# Patient Record
Sex: MALE | Race: WHITE | NOT HISPANIC OR LATINO | Employment: FULL TIME | URBAN - METROPOLITAN AREA
[De-identification: names, ages, dates, MRNs, and addresses within clinical notes are randomized per-mention and may not be internally consistent; named-entity substitution may affect disease eponyms.]

---

## 2017-06-02 ENCOUNTER — HOSPITAL ENCOUNTER (OUTPATIENT)
Facility: MEDICAL CENTER | Age: 32
End: 2017-06-02
Attending: PHYSICIAN ASSISTANT
Payer: COMMERCIAL

## 2017-06-02 ENCOUNTER — OFFICE VISIT (OUTPATIENT)
Dept: URGENT CARE | Facility: CLINIC | Age: 32
End: 2017-06-02
Payer: COMMERCIAL

## 2017-06-02 VITALS
OXYGEN SATURATION: 99 % | DIASTOLIC BLOOD PRESSURE: 88 MMHG | HEIGHT: 67 IN | BODY MASS INDEX: 28.25 KG/M2 | WEIGHT: 180 LBS | RESPIRATION RATE: 15 BRPM | TEMPERATURE: 98.1 F | HEART RATE: 89 BPM | SYSTOLIC BLOOD PRESSURE: 142 MMHG

## 2017-06-02 DIAGNOSIS — N43.3 HYDROCELE, UNSPECIFIED HYDROCELE TYPE: ICD-10-CM

## 2017-06-02 DIAGNOSIS — N50.819 TESTICLE PAIN: ICD-10-CM

## 2017-06-02 LAB
APPEARANCE UR: CLEAR
BILIRUB UR STRIP-MCNC: NORMAL MG/DL
COLOR UR AUTO: YELLOW
GLUCOSE UR STRIP.AUTO-MCNC: NORMAL MG/DL
KETONES UR STRIP.AUTO-MCNC: NORMAL MG/DL
LEUKOCYTE ESTERASE UR QL STRIP.AUTO: NORMAL
NITRITE UR QL STRIP.AUTO: NORMAL
PH UR STRIP.AUTO: 5 [PH] (ref 5–8)
PROT UR QL STRIP: NORMAL MG/DL
RBC UR QL AUTO: NORMAL
SP GR UR STRIP.AUTO: 1
UROBILINOGEN UR STRIP-MCNC: NORMAL MG/DL

## 2017-06-02 PROCEDURE — 81002 URINALYSIS NONAUTO W/O SCOPE: CPT | Performed by: PHYSICIAN ASSISTANT

## 2017-06-02 PROCEDURE — 99204 OFFICE O/P NEW MOD 45 MIN: CPT | Performed by: PHYSICIAN ASSISTANT

## 2017-06-02 ASSESSMENT — ENCOUNTER SYMPTOMS
FLANK PAIN: 0
AFFECTED TESTICLE: 1
PALPITATIONS: 0
SHORTNESS OF BREATH: 0
CHILLS: 0
FEVER: 0
COUGH: 0
VOMITING: 0
NAUSEA: 0
BACK PAIN: 0

## 2017-06-02 NOTE — PROGRESS NOTES
"Subjective:      Gadiel Cabrera is a 31 y.o. male who presents with Testicle Pain            Testicle Pain  This is a new problem. Episode onset: 3 weeks ago. The problem occurs constantly. The problem has been waxing and waning. Pertinent negatives include no chest pain, chills, coughing, fever, nausea or vomiting. The symptoms are aggravated by standing. He has tried nothing for the symptoms. The treatment provided no relief.       Review of Systems   Constitutional: Negative for fever and chills.   Respiratory: Negative for cough and shortness of breath.    Cardiovascular: Negative for chest pain and palpitations.   Gastrointestinal: Negative for nausea and vomiting.   Genitourinary: Negative for dysuria, urgency, frequency, hematuria and flank pain.        Testicular pain     Musculoskeletal: Negative for back pain.     All other systems reviewed and are negative.  PMH:  has no past medical history on file.  MEDS: No current outpatient prescriptions on file.  ALLERGIES: No Known Allergies  SURGHX: History reviewed. No pertinent past surgical history.  SOCHX:  reports that he has never smoked. He does not have any smokeless tobacco history on file.  FH: Family history was reviewed, no pertinent findings to report  Medications, Allergies, and current problem list reviewed today in Epic       Objective:     /88 mmHg  Pulse 89  Temp(Src) 36.7 °C (98.1 °F)  Resp 15  Ht 1.702 m (5' 7.01\")  Wt 81.647 kg (180 lb)  BMI 28.19 kg/m2  SpO2 99%     Physical Exam   Constitutional: He is oriented to person, place, and time. He appears well-developed and well-nourished.   Neck: Normal range of motion. Neck supple.   Cardiovascular: Normal rate, regular rhythm and normal heart sounds.    Pulmonary/Chest: Effort normal and breath sounds normal.   Abdominal: Hernia confirmed negative in the right inguinal area and confirmed negative in the left inguinal area.   Genitourinary: Penis normal. Cremasteric reflex is " present. Right testis shows tenderness. Right testis shows no mass and no swelling. Left testis shows tenderness. Left testis shows no mass and no swelling. Circumcised. No penile tenderness.   Musculoskeletal: He exhibits no tenderness.   No CVA tenderness   Neurological: He is alert and oriented to person, place, and time.   Skin: Skin is warm and dry.   Psychiatric: He has a normal mood and affect. His behavior is normal. Judgment and thought content normal.   Vitals reviewed.         6/12/2017 8:03 AM    HISTORY/REASON FOR EXAM: Scrotum pain.    TECHNIQUE/EXAM DESCRIPTION:  Real-time sonography of the scrotum was performed with gray-scale, color and duplex Doppler imaging.    COMPARISON: None    FINDINGS:  The testes are homogeneous in echotexture and low-resistive waveforms are confirmed bilaterally. No intratesticular mass is seen. Vascular flow is symmetric.    The right testis measures 4.55 cm x 2.23 cm x 2.81 cm.  The left testis measures  4.55 cm x 2.34 cm x 3.02 cm.    Appearance of the epididymides are within normal limits.    Small left larger than right hydroceles are detected.    No varicocele is detected.         Impression        Normal scrotum ultrasound aside from small hydroceles.          Assessment/Plan:   Patient is a 31-year-old male who presents with testicular pain for 3 weeks. Patient states that he had no trauma to the area and pain gradually occurred. It is worse with movement and usually constant. He is monogamous with his girlfriend he denies dysuria, no discharge. On exam of the testicles there is pain to palpation on the posterior aspect. Cremaster reflex is present. No inguinal hernias were appreciated. UA is negative. We discussed differential diagnosis. Most likely epididymitis. Patient would like to hold antibiotics at this time and wait for urine cultures. If urine cultures are negative he will agree to a ultrasound.  US shows small hydroceles.  Urine culture for mary alice/chlamydia  negative from other facility.  Normal Urine cultures were lost by carrier and we do not have results.      1. Testicle pain    - URINE CULTURE(NEW); Future  - POCT Urinalysis  - CHLAMYDIA/GC PCR URINE OR SWAB; Future  - Referral to Urology    Differential diagnosis, natural history, supportive care, and indications for immediate follow-up discussed at length.   Follow-up with primary care provider within 4-5 days, emergency room precautions discussed.  Patient and/or family appears understanding of information.

## 2017-06-02 NOTE — PATIENT INSTRUCTIONS
Epididymitis  Epididymitis is swelling (inflammation) of the epididymis. The epididymis is a cord-like structure that is located along the back part of the testicle. Epididymitis is usually, but not always, caused by infection. This is usually a sudden problem that begins with chills, fever, and pain behind the scrotum and in the testicle. There may be swelling and redness of the testicle.  CAUSES  · STDs (sexually transmitted diseases).  ¨ Gonorrhea.  ¨ Chlamydia.  · Other contagious diseases, such as tuberculosis.  · Bladder outlet obstruction.  RISK FACTORS  · Having unprotected sex.  · Having anal sex.  · Having had a prostate biopsy.  · Having had an instrument inserted into the urinary tract, such as a urinary catheter.  · Having a suppressed immune system.  DIAGNOSIS  Your health care provider may use any of the following methods to diagnose epididymitis:  · A physical exam.  · An ultrasound-guided biopsy.  · A urine test for infections, such as STDs.  Your health care provider may test you for other STDs, including HIV (human immunodeficiency virus).  TREATMENT  Antibiotic medicine may be prescribed for epididymitis that is caused by an infection. Severe cases may require surgery.  HOME CARE INSTRUCTIONS  · To help relieve pain, take hot sitz baths for 20 minutes, 4 times per day.  · If your epididymitis was caused by an STD, avoid sexual activity until your treatment is complete.  · If you test positive for an STD, inform your sexual partners. They may need to be treated.  · Take medicines only as directed by your health care provider. These include over-the-counter medicines and prescription medicines for pain, discomfort, or fever.  · Take your antibiotic medicine as directed by your health care provider. Finish the antibiotic even if you start to feel better.  · Keep all follow-up visits as directed by your health care provider. This is important.  SEEK IMMEDIATE MEDICAL CARE IF:  · You have a  fever.  · Your pain medicine is not helping.  · Your pain is getting worse.  · Your pain seems to come and go.  · You develop pain, redness, and swelling in the scrotum or the areas around it.     This information is not intended to replace advice given to you by your health care provider. Make sure you discuss any questions you have with your health care provider.     Document Released: 12/15/2001 Document Revised: 2016 Document Reviewed: 2010  [employer] Interactive Patient Education  [employer]  Testicular Torsion  Testicular torsion is a twisting of the spermatic cord, artery, and vein that go to the testicle. This twisting cuts off the blood supply to everything in the sac that contains the testes, blood vessels, and part of the spermatic cord (scrotum). Testicular torsion is most commonly seen in  and adolescent males. It can also occur before birth.  Testicular torsion requires emergency treatment. The testicle usually can be saved if the torsion is treated within 6 hours of onset. If the torsion is left untreated for too long, the testicle will die and have to be removed.   CAUSES   Torsion can be caused by a hit on the scrotum or by certain movements during exercise. In some males, testicular torsion is more common because the connection of their testicle to a specific tissue in their scrotum developed in the wrong place, allowing the testicle to rotate and the cord to get twisted.   SIGNS AND SYMPTOMS   The main symptom of testicular torsion is pain in your testicle. The scrotum may be swollen, red, hard, and very tender. There will be excess fluid in the tissue (edema). The testicle may be higher than normal in the scrotum. The skin of the scrotum may be stuck to the testicle. You may have nausea, vomiting, and a fever.  DIAGNOSIS   Often testicular torsion is diagnosed through a physical exam. Sometimes imaging exams and tests to measure blood flow may be done.  TREATMENT   A  manual untwisting of the testicle may be done when the testicle is still mobile and the maneuver is not too painful. However, surgery usually is necessary and should be done as soon as possible after torsion occurs. During surgery, the testicle is untwisted and evaluated and possibly removed.      This information is not intended to replace advice given to you by your health care provider. Make sure you discuss any questions you have with your health care provider.     Document Released: 12/18/2006 Document Revised: 12/23/2014 Document Reviewed: 06/09/2014  [employer] Interactive Patient Education ©2016 [employer]  Inguinal Hernia, Adult  Muscles help keep everything in the body in its proper place. But if a weak spot in the muscles develops, something can poke through. That is called a hernia. When this happens in the lower part of the belly (abdomen), it is called an inguinal hernia. (It takes its name from a part of the body in this region called the inguinal canal.) A weak spot in the wall of muscles lets some fat or part of the small intestine bulge through. An inguinal hernia can develop at any age. Men get them more often than women.  CAUSES   In adults, an inguinal hernia develops over time.  · It can be triggered by:  ¨ Suddenly straining the muscles of the lower abdomen.  ¨ Lifting heavy objects.  ¨ Straining to have a bowel movement. Difficult bowel movements (constipation) can lead to this.  ¨ Constant coughing. This may be caused by smoking or lung disease.  ¨ Being overweight.  ¨ Being pregnant.  ¨ Working at a job that requires long periods of standing or heavy lifting.  ¨ Having had an inguinal hernia before.  One type can be an emergency situation. It is called a strangulated inguinal hernia. It develops if part of the small intestine slips through the weak spot and cannot get back into the abdomen. The blood supply can be cut off. If that happens, part of the intestine may die. This situation  requires emergency surgery.  SYMPTOMS   Often, a small inguinal hernia has no symptoms. It is found when a healthcare provider does a physical exam. Larger hernias usually have symptoms.   · In adults, symptoms may include:  ¨ A lump in the groin. This is easier to see when the person is standing. It might disappear when lying down.  ¨ In men, a lump in the scrotum.  ¨ Pain or burning in the groin. This occurs especially when lifting, straining or coughing.  ¨ A dull ache or feeling of pressure in the groin.  · Signs of a strangulated hernia can include:  ¨ A bulge in the groin that becomes very painful and tender to the touch.  ¨ A bulge that turns red or purple.  ¨ Fever, nausea and vomiting.  ¨ Inability to have a bowel movement or to pass gas.  DIAGNOSIS   To decide if you have an inguinal hernia, a healthcare provider will probably do a physical examination.  · This will include asking questions about any symptoms you have noticed.  · The healthcare provider might feel the groin area and ask you to cough. If an inguinal hernia is felt, the healthcare provider may try to slide it back into the abdomen.  · Usually no other tests are needed.  TREATMENT   Treatments can vary. The size of the hernia makes a difference. Options include:  · Watchful waiting. This is often suggested if the hernia is small and you have had no symptoms.  ¨ No medical procedure will be done unless symptoms develop.  ¨ You will need to watch closely for symptoms. If any occur, contact your healthcare provider right away.  · Surgery. This is used if the hernia is larger or you have symptoms.  ¨ Open surgery. This is usually an outpatient procedure (you will not stay overnight in a hospital). An cut (incision) is made through the skin in the groin. The hernia is put back inside the abdomen. The weak area in the muscles is then repaired by herniorrhaphy or hernioplasty. Herniorrhaphy: in this type of surgery, the weak muscles are sewn back  "together. Hernioplasty: a patch or mesh is used to close the weak area in the abdominal wall.  ¨ Laparoscopy. In this procedure, a surgeon makes small incisions. A thin tube with a tiny video camera (called a laparoscope) is put into the abdomen. The surgeon repairs the hernia with mesh by looking with the video camera and using two long instruments.  HOME CARE INSTRUCTIONS   · After surgery to repair an inguinal hernia:  ¨ You will need to take pain medicine prescribed by your healthcare provider. Follow all directions carefully.  ¨ You will need to take care of the wound from the incision.  ¨ Your activity will be restricted for awhile. This will probably include no heavy lifting for several weeks. You also should not do anything too active for a few weeks. When you can return to work will depend on the type of job that you have.  · During \"watchful waiting\" periods, you should:  ¨ Maintain a healthy weight.  ¨ Eat a diet high in fiber (fruits, vegetables and whole grains).  ¨ Drink plenty of fluids to avoid constipation. This means drinking enough water and other liquids to keep your urine clear or pale yellow.  ¨ Do not lift heavy objects.  ¨ Do not stand for long periods of time.  ¨ Quit smoking. This should keep you from developing a frequent cough.  SEEK MEDICAL CARE IF:   · A bulge develops in your groin area.  · You feel pain, a burning sensation or pressure in the groin. This might be worse if you are lifting or straining.  · You develop a fever of more than 100.5° F (38.1° C).  SEEK IMMEDIATE MEDICAL CARE IF:   · Pain in the groin increases suddenly.  · A bulge in the groin gets bigger suddenly and does not go down.  · For men, there is sudden pain in the scrotum. Or, the size of the scrotum increases.  · A bulge in the groin area becomes red or purple and is painful to touch.  · You have nausea or vomiting that does not go away.  · You feel your heart beating much faster than normal.  · You cannot have a " bowel movement or pass gas.  · You develop a fever of more than 102.0° F (38.9° C).     This information is not intended to replace advice given to you by your health care provider. Make sure you discuss any questions you have with your health care provider.     Document Released: 05/06/2010 Document Revised: 03/11/2013 Document Reviewed: 05/06/2010  [email or web address] Interactive Patient Education ©2016 [employer]

## 2017-06-02 NOTE — MR AVS SNAPSHOT
"        Gadiel Cabrera   2017 10:30 AM   Office Visit   MRN: [id number]    Department:  [facility]   Dept Phone:  [phone]    Description:  Male : 1985   Provider:  Dharmesh Armas PA-C           Reason for Visit     Testicle Pain atraumatic unspecified generalized testicular pain x 3 weeks.       Allergies as of 2017     No Known Allergies      You were diagnosed with     Testicle pain   [[id number]]         Vital Signs     Blood Pressure Pulse Temperature Respirations Height Weight    142/88 mmHg 89 36.7 °C (98.1 °F) 15 1.702 m (5' 7.01\") 81.647 kg (180 lb)    Body Mass Index Oxygen Saturation Smoking Status             28.19 kg/m2 99% Never Smoker          Basic Information     Date Of Birth Sex Race Ethnicity Preferred Language    1985 Male White Non- English      Health Maintenance     Patient has no pending health maintenance at this time      Results     POCT Urinalysis      Component Value Standard Range & Units    POC Color yellow Negative    POC Appearance clear Negative    POC Leukocyte Esterase n Negative    POC Nitrites n Negative    POC Urobiligen n Negative (0.2) mg/dL    POC Protein n Negative mg/dL    POC Urine PH 5.0 5.0 - 8.0    POC Blood n Negative    POC Specific Gravity 1.005 <1.005 - >1.030    POC Ketones n Negative mg/dL    POC Biliruben n Negative mg/dL    POC Glucose n Negative mg/dL                        Current Immunizations     No immunizations on file.      Below and/or attached are the medications your provider expects you to take. Review all of your home medications and newly ordered medications with your provider and/or pharmacist. Follow medication instructions as directed by your provider and/or pharmacist. Please keep your medication list with you and share with your provider. Update the information when medications are discontinued, doses are changed, or new medications (including over-the-counter products) are added; and carry medication " information at all times in the event of emergency situations     Allergies:  No Known Allergies          Medications  Valid as of: June 02, 2017 - 11:19 AM    Generic Name Brand Name Tablet Size Instructions for use    .                 Medicines prescribed today were sent to:     [facility] #[id number] - [place], [place] - [address]    [address] [place] [address]    Phone: [phone] Fax: [phone]    Open 24 Hours?: No      Medication refill instructions:       If your prescription bottle indicates you have medication refills left, it is not necessary to call your provider’s office. Please contact your pharmacy and they will refill your medication.    If your prescription bottle indicates you do not have any refills left, you may request refills at any time through one of the following ways: The online [employer] system (except Urgent Care), by calling your provider’s office, or by asking your pharmacy to contact your provider’s office with a refill request. Medication refills are processed only during regular business hours and may not be available until the next business day. Your provider may request additional information or to have a follow-up visit with you prior to refilling your medication.   *Please Note: Medication refills are assigned a new Rx number when refilled electronically. Your pharmacy may indicate that no refills were authorized even though a new prescription for the same medication is available at the pharmacy. Please request the medicine by name with the pharmacy before contacting your provider for a refill.        Your To Do List     Future Labs/Procedures Complete By Expires    CHLAMYDIA/GC PCR URINE OR SWAB  As directed 6/2/2018    URINE CULTURE(NEW)  As directed 6/2/2018      Instructions    Epididymitis  Epididymitis is swelling (inflammation) of the epididymis. The epididymis is a cord-like structure that is located along the back part of the testicle. Epididymitis is  usually, but not always, caused by infection. This is usually a sudden problem that begins with chills, fever, and pain behind the scrotum and in the testicle. There may be swelling and redness of the testicle.  CAUSES  · STDs (sexually transmitted diseases).  ¨ Gonorrhea.  ¨ Chlamydia.  · Other contagious diseases, such as tuberculosis.  · Bladder outlet obstruction.  RISK FACTORS  · Having unprotected sex.  · Having anal sex.  · Having had a prostate biopsy.  · Having had an instrument inserted into the urinary tract, such as a urinary catheter.  · Having a suppressed immune system.  DIAGNOSIS  Your health care provider may use any of the following methods to diagnose epididymitis:  · A physical exam.  · An ultrasound-guided biopsy.  · A urine test for infections, such as STDs.  Your health care provider may test you for other STDs, including HIV (human immunodeficiency virus).  TREATMENT  Antibiotic medicine may be prescribed for epididymitis that is caused by an infection. Severe cases may require surgery.  HOME CARE INSTRUCTIONS  · To help relieve pain, take hot sitz baths for 20 minutes, 4 times per day.  · If your epididymitis was caused by an STD, avoid sexual activity until your treatment is complete.  · If you test positive for an STD, inform your sexual partners. They may need to be treated.  · Take medicines only as directed by your health care provider. These include over-the-counter medicines and prescription medicines for pain, discomfort, or fever.  · Take your antibiotic medicine as directed by your health care provider. Finish the antibiotic even if you start to feel better.  · Keep all follow-up visits as directed by your health care provider. This is important.  SEEK IMMEDIATE MEDICAL CARE IF:  · You have a fever.  · Your pain medicine is not helping.  · Your pain is getting worse.  · Your pain seems to come and go.  · You develop pain, redness, and swelling in the scrotum or the areas around  it.     This information is not intended to replace advice given to you by your health care provider. Make sure you discuss any questions you have with your health care provider.     Document Released: 12/15/2001 Document Revised: 2016 Document Reviewed: 2010  [employer] Interactive Patient Education © [employer]  Testicular Torsion  Testicular torsion is a twisting of the spermatic cord, artery, and vein that go to the testicle. This twisting cuts off the blood supply to everything in the sac that contains the testes, blood vessels, and part of the spermatic cord (scrotum). Testicular torsion is most commonly seen in  and adolescent males. It can also occur before birth.  Testicular torsion requires emergency treatment. The testicle usually can be saved if the torsion is treated within 6 hours of onset. If the torsion is left untreated for too long, the testicle will die and have to be removed.   CAUSES   Torsion can be caused by a hit on the scrotum or by certain movements during exercise. In some males, testicular torsion is more common because the connection of their testicle to a specific tissue in their scrotum developed in the wrong place, allowing the testicle to rotate and the cord to get twisted.   SIGNS AND SYMPTOMS   The main symptom of testicular torsion is pain in your testicle. The scrotum may be swollen, red, hard, and very tender. There will be excess fluid in the tissue (edema). The testicle may be higher than normal in the scrotum. The skin of the scrotum may be stuck to the testicle. You may have nausea, vomiting, and a fever.  DIAGNOSIS   Often testicular torsion is diagnosed through a physical exam. Sometimes imaging exams and tests to measure blood flow may be done.  TREATMENT   A manual untwisting of the testicle may be done when the testicle is still mobile and the maneuver is not too painful. However, surgery usually is necessary and should be done as soon as  possible after torsion occurs. During surgery, the testicle is untwisted and evaluated and possibly removed.      This information is not intended to replace advice given to you by your health care provider. Make sure you discuss any questions you have with your health care provider.     Document Released: 12/18/2006 Document Revised: 12/23/2014 Document Reviewed: 06/09/2014  [employer] Interactive Patient Education ©2016 [employer].  Inguinal Hernia, Adult  Muscles help keep everything in the body in its proper place. But if a weak spot in the muscles develops, something can poke through. That is called a hernia. When this happens in the lower part of the belly (abdomen), it is called an inguinal hernia. (It takes its name from a part of the body in this region called the inguinal canal.) A weak spot in the wall of muscles lets some fat or part of the small intestine bulge through. An inguinal hernia can develop at any age. Men get them more often than women.  CAUSES   In adults, an inguinal hernia develops over time.  · It can be triggered by:  ¨ Suddenly straining the muscles of the lower abdomen.  ¨ Lifting heavy objects.  ¨ Straining to have a bowel movement. Difficult bowel movements (constipation) can lead to this.  ¨ Constant coughing. This may be caused by smoking or lung disease.  ¨ Being overweight.  ¨ Being pregnant.  ¨ Working at a job that requires long periods of standing or heavy lifting.  ¨ Having had an inguinal hernia before.  One type can be an emergency situation. It is called a strangulated inguinal hernia. It develops if part of the small intestine slips through the weak spot and cannot get back into the abdomen. The blood supply can be cut off. If that happens, part of the intestine may die. This situation requires emergency surgery.  SYMPTOMS   Often, a small inguinal hernia has no symptoms. It is found when a healthcare provider does a physical exam. Larger hernias usually have symptoms.    · In adults, symptoms may include:  ¨ A lump in the groin. This is easier to see when the person is standing. It might disappear when lying down.  ¨ In men, a lump in the scrotum.  ¨ Pain or burning in the groin. This occurs especially when lifting, straining or coughing.  ¨ A dull ache or feeling of pressure in the groin.  · Signs of a strangulated hernia can include:  ¨ A bulge in the groin that becomes very painful and tender to the touch.  ¨ A bulge that turns red or purple.  ¨ Fever, nausea and vomiting.  ¨ Inability to have a bowel movement or to pass gas.  DIAGNOSIS   To decide if you have an inguinal hernia, a healthcare provider will probably do a physical examination.  · This will include asking questions about any symptoms you have noticed.  · The healthcare provider might feel the groin area and ask you to cough. If an inguinal hernia is felt, the healthcare provider may try to slide it back into the abdomen.  · Usually no other tests are needed.  TREATMENT   Treatments can vary. The size of the hernia makes a difference. Options include:  · Watchful waiting. This is often suggested if the hernia is small and you have had no symptoms.  ¨ No medical procedure will be done unless symptoms develop.  ¨ You will need to watch closely for symptoms. If any occur, contact your healthcare provider right away.  · Surgery. This is used if the hernia is larger or you have symptoms.  ¨ Open surgery. This is usually an outpatient procedure (you will not stay overnight in a hospital). An cut (incision) is made through the skin in the groin. The hernia is put back inside the abdomen. The weak area in the muscles is then repaired by herniorrhaphy or hernioplasty. Herniorrhaphy: in this type of surgery, the weak muscles are sewn back together. Hernioplasty: a patch or mesh is used to close the weak area in the abdominal wall.  ¨ Laparoscopy. In this procedure, a surgeon makes small incisions. A thin tube with a tiny  "video camera (called a laparoscope) is put into the abdomen. The surgeon repairs the hernia with mesh by looking with the video camera and using two long instruments.  HOME CARE INSTRUCTIONS   · After surgery to repair an inguinal hernia:  ¨ You will need to take pain medicine prescribed by your healthcare provider. Follow all directions carefully.  ¨ You will need to take care of the wound from the incision.  ¨ Your activity will be restricted for awhile. This will probably include no heavy lifting for several weeks. You also should not do anything too active for a few weeks. When you can return to work will depend on the type of job that you have.  · During \"watchful waiting\" periods, you should:  ¨ Maintain a healthy weight.  ¨ Eat a diet high in fiber (fruits, vegetables and whole grains).  ¨ Drink plenty of fluids to avoid constipation. This means drinking enough water and other liquids to keep your urine clear or pale yellow.  ¨ Do not lift heavy objects.  ¨ Do not stand for long periods of time.  ¨ Quit smoking. This should keep you from developing a frequent cough.  SEEK MEDICAL CARE IF:   · A bulge develops in your groin area.  · You feel pain, a burning sensation or pressure in the groin. This might be worse if you are lifting or straining.  · You develop a fever of more than 100.5° F (38.1° C).  SEEK IMMEDIATE MEDICAL CARE IF:   · Pain in the groin increases suddenly.  · A bulge in the groin gets bigger suddenly and does not go down.  · For men, there is sudden pain in the scrotum. Or, the size of the scrotum increases.  · A bulge in the groin area becomes red or purple and is painful to touch.  · You have nausea or vomiting that does not go away.  · You feel your heart beating much faster than normal.  · You cannot have a bowel movement or pass gas.  · You develop a fever of more than 102.0° F (38.9° C).     This information is not intended to replace advice given to you by your health care provider. " Make sure you discuss any questions you have with your health care provider.     Document Released: 05/06/2010 Document Revised: 03/11/2013 Document Reviewed: 05/06/2010  Elsevier Interactive Patient Education ©2016 [employer]            MyChart Status: Patient Declined

## 2017-06-09 ENCOUNTER — TELEPHONE (OUTPATIENT)
Dept: URGENT CARE | Facility: CLINIC | Age: 32
End: 2017-06-09

## 2017-06-12 ENCOUNTER — HOSPITAL ENCOUNTER (OUTPATIENT)
Dept: RADIOLOGY | Facility: MEDICAL CENTER | Age: 32
End: 2017-06-12
Attending: PHYSICIAN ASSISTANT
Payer: COMMERCIAL

## 2017-06-12 DIAGNOSIS — N50.819 TESTICLE PAIN: ICD-10-CM

## 2017-06-12 PROCEDURE — 76870 US EXAM SCROTUM: CPT

## 2017-06-16 DIAGNOSIS — N43.3 HYDROCELE, UNSPECIFIED HYDROCELE TYPE: ICD-10-CM

## 2017-08-13 ENCOUNTER — OFFICE VISIT (OUTPATIENT)
Dept: URGENT CARE | Facility: CLINIC | Age: 32
End: 2017-08-13
Payer: COMMERCIAL

## 2017-08-13 VITALS
RESPIRATION RATE: 16 BRPM | DIASTOLIC BLOOD PRESSURE: 74 MMHG | HEART RATE: 54 BPM | HEIGHT: 68 IN | OXYGEN SATURATION: 97 % | TEMPERATURE: 98.3 F | BODY MASS INDEX: 27.28 KG/M2 | WEIGHT: 180 LBS | SYSTOLIC BLOOD PRESSURE: 118 MMHG

## 2017-08-13 DIAGNOSIS — S09.92XA INJURY OF NASAL SEPTUM, INITIAL ENCOUNTER: ICD-10-CM

## 2017-08-13 DIAGNOSIS — S00.33XA NASAL CONTUSION: ICD-10-CM

## 2017-08-13 PROCEDURE — 99213 OFFICE O/P EST LOW 20 MIN: CPT | Performed by: NURSE PRACTITIONER

## 2017-08-13 ASSESSMENT — ENCOUNTER SYMPTOMS
CHILLS: 0
HEADACHES: 0
FEVER: 0
COUGH: 0

## 2017-08-13 NOTE — MR AVS SNAPSHOT
"        Gadiel Cabrera   2017 9:15 AM   Office Visit   MRN: [id number]    Department:  [facility]   Dept Phone:  [phone]    Description:  Male : 1985   Provider:  SANTOS Romero           Reason for Visit     Facial Injury x 3 days       Allergies as of 2017     No Known Allergies      You were diagnosed with     Injury of nasal septum, initial encounter   [[id number]]       Nasal contusion   [[id number]]         Vital Signs     Blood Pressure Pulse Temperature Respirations Height Weight    118/74 mmHg 54 36.8 °C (98.3 °F) 16 1.715 m (5' 7.5\") 81.647 kg (180 lb)    Body Mass Index Oxygen Saturation Smoking Status             27.76 kg/m2 97% Never Smoker          Basic Information     Date Of Birth Sex Race Ethnicity Preferred Language    1985 Male White Non- English      Health Maintenance        Date Due Completion Dates    IMM DTaP/Tdap/Td Vaccine (1 - Tdap) 2005 ---    IMM INFLUENZA (1) 2017 ---            Current Immunizations     No immunizations on file.      Below and/or attached are the medications your provider expects you to take. Review all of your home medications and newly ordered medications with your provider and/or pharmacist. Follow medication instructions as directed by your provider and/or pharmacist. Please keep your medication list with you and share with your provider. Update the information when medications are discontinued, doses are changed, or new medications (including over-the-counter products) are added; and carry medication information at all times in the event of emergency situations     Allergies:  No Known Allergies          Medications  Valid as of: 2017 -  9:40 AM    Generic Name Brand Name Tablet Size Instructions for use    .                 Medicines prescribed today were sent to:     [facility]   [place] - [address]  [place] [address]    Phone: [phone] Fax: [phone]    Open 24 " Hours?: No      Medication refill instructions:       If your prescription bottle indicates you have medication refills left, it is not necessary to call your provider’s office. Please contact your pharmacy and they will refill your medication.    If your prescription bottle indicates you do not have any refills left, you may request refills at any time through one of the following ways: The online [employer] system (except Urgent Care), by calling your provider’s office, or by asking your pharmacy to contact your provider’s office with a refill request. Medication refills are processed only during regular business hours and may not be available until the next business day. Your provider may request additional information or to have a follow-up visit with you prior to refilling your medication.   *Please Note: Medication refills are assigned a new Rx number when refilled electronically. Your pharmacy may indicate that no refills were authorized even though a new prescription for the same medication is available at the pharmacy. Please request the medicine by name with the pharmacy before contacting your provider for a refill.        Your To Do List     Future Labs/Procedures Complete By Expires    DX-NASAL BONES 3+  As directed 8/13/2018         [facility] [employer] Status: Patient Declined

## 2017-08-13 NOTE — PROGRESS NOTES
"Subjective:      Gadiel Cabrera is a 32 y.o. male who presents with Facial Injury            Facial Injury  This is a new problem. The current episode started in the past 7 days. The problem occurs constantly. The problem has been unchanged. Associated symptoms include congestion. Pertinent negatives include no chills, coughing, fever or headaches.   Patient is 32 year old with nasal injury after getting hit in nose by an arm. The injury is to end of nose. Swelling/mild pain. Pain is mild. Did have some bleeding.  Allergies, medications and history reviewed by me today      Review of Systems   Constitutional: Negative for fever and chills.   HENT: Positive for congestion.    Respiratory: Negative for cough.    Neurological: Negative for headaches.          Objective:     /74 mmHg  Pulse 54  Temp(Src) 36.8 °C (98.3 °F)  Resp 16  Ht 1.715 m (5' 7.5\")  Wt 81.647 kg (180 lb)  BMI 27.76 kg/m2  SpO2 97%     Physical Exam   Constitutional: He is oriented to person, place, and time. He appears well-developed and well-nourished. No distress.   HENT:   Head: Normocephalic and atraumatic.   Right Ear: External ear and ear canal normal. Tympanic membrane is not injected and not perforated. No middle ear effusion.   Left Ear: External ear and ear canal normal. Tympanic membrane is not injected and not perforated.  No middle ear effusion.   Nose: Mucosal edema and sinus tenderness present. No septal deviation.       Mouth/Throat: No oropharyngeal exudate or posterior oropharyngeal erythema.   Eyes: Conjunctivae are normal. Right eye exhibits no discharge. Left eye exhibits no discharge.   Neck: Normal range of motion. Neck supple.   Cardiovascular: Normal rate, regular rhythm and normal heart sounds.    No murmur heard.  Pulmonary/Chest: Effort normal and breath sounds normal. No respiratory distress.   Musculoskeletal: Normal range of motion.   Normal movement of all 4 extremities.   Lymphadenopathy:     He has no " cervical adenopathy.        Right: No supraclavicular adenopathy present.        Left: No supraclavicular adenopathy present.   Neurological: He is alert and oriented to person, place, and time. Gait normal.   Skin: Skin is warm and dry.   Psychiatric: He has a normal mood and affect. His behavior is normal. Thought content normal.   Nursing note and vitals reviewed.              Assessment/Plan:     1. Injury of nasal septum, initial encounter  DX-NASAL BONES 3+   2. Nasal contusion       X-ray.  Afrin for congestion. He is swollen on right side.   Will watch and treat conservatively with ice and nsaids. He knows he can call if he would like ENT referral.

## 2019-07-11 ENCOUNTER — HOSPITAL ENCOUNTER (OUTPATIENT)
Facility: MEDICAL CENTER | Age: 34
End: 2019-07-11
Attending: PHYSICIAN ASSISTANT
Payer: COMMERCIAL

## 2019-07-11 ENCOUNTER — OFFICE VISIT (OUTPATIENT)
Dept: URGENT CARE | Facility: CLINIC | Age: 34
End: 2019-07-11
Payer: COMMERCIAL

## 2019-07-11 VITALS
BODY MASS INDEX: 27.01 KG/M2 | HEART RATE: 67 BPM | WEIGHT: 178.2 LBS | HEIGHT: 68 IN | TEMPERATURE: 97.4 F | RESPIRATION RATE: 16 BRPM | DIASTOLIC BLOOD PRESSURE: 96 MMHG | OXYGEN SATURATION: 99 % | SYSTOLIC BLOOD PRESSURE: 130 MMHG

## 2019-07-11 DIAGNOSIS — R36.9 PENILE DISCHARGE: ICD-10-CM

## 2019-07-11 PROCEDURE — 99214 OFFICE O/P EST MOD 30 MIN: CPT | Mod: 25 | Performed by: PHYSICIAN ASSISTANT

## 2019-07-11 RX ORDER — AZITHROMYCIN 500 MG/1
1000 TABLET, FILM COATED ORAL ONCE
Qty: 2 TAB | Refills: 0 | Status: SHIPPED | OUTPATIENT
Start: 2019-07-11 | End: 2019-07-11

## 2019-07-11 RX ORDER — CEFTRIAXONE SODIUM 250 MG/1
250 INJECTION, POWDER, FOR SOLUTION INTRAMUSCULAR; INTRAVENOUS ONCE
Status: COMPLETED | OUTPATIENT
Start: 2019-07-11 | End: 2019-07-11

## 2019-07-11 RX ADMIN — CEFTRIAXONE SODIUM 250 MG: 250 INJECTION, POWDER, FOR SOLUTION INTRAMUSCULAR; INTRAVENOUS at 10:17

## 2019-07-11 ASSESSMENT — ENCOUNTER SYMPTOMS
FLANK PAIN: 0
SHORTNESS OF BREATH: 0
FEVER: 0
PALPITATIONS: 0
CHILLS: 0
BACK PAIN: 0
COUGH: 0

## 2019-07-11 NOTE — PROGRESS NOTES
"Subjective:      Gadiel Cabrera is a 34 y.o. male who presents with Other (STD symptoms, irritations been bad last two days, been going on for x 1 week )            Sexually Transmitted Diseases   This is a new problem. The current episode started in the past 7 days. The problem occurs constantly. Pertinent negatives include no chest pain, chills, coughing or fever. Associated symptoms comments: Penile discharge  . Nothing aggravates the symptoms. He has tried nothing for the symptoms.       Review of Systems   Constitutional: Negative for chills and fever.   Respiratory: Negative for cough and shortness of breath.    Cardiovascular: Negative for chest pain and palpitations.   Genitourinary: Positive for dysuria. Negative for flank pain, frequency, hematuria and urgency.   Musculoskeletal: Negative for back pain.   All other systems reviewed and are negative.    PMH:  has no past medical history on file.  MEDS:   Current Outpatient Prescriptions:   •  azithromycin (ZITHROMAX) 500 MG tablet, Take 2 Tabs by mouth Once for 1 dose., Disp: 2 Tab, Rfl: 0    Current Facility-Administered Medications:   •  cefTRIAXone (ROCEPHIN) injection 250 mg, 250 mg, Intramuscular, Once, Dharmesh Armas, P.A.-C.  ALLERGIES: No Known Allergies  SURGHX: No past surgical history on file.  SOCHX:  reports that he has never smoked. He has never used smokeless tobacco. He reports that he does not drink alcohol or use drugs.  FH: Family history was reviewed, no pertinent findings to report  Medications, Allergies, and current problem list reviewed today in Epic       Objective:     /96   Pulse 67   Temp 36.3 °C (97.4 °F)   Resp 16   Ht 1.727 m (5' 8\")   Wt 80.8 kg (178 lb 3.2 oz)   SpO2 99%   BMI 27.10 kg/m²      Physical Exam   Constitutional: He is oriented to person, place, and time. He appears well-developed and well-nourished.   HENT:   Head: Normocephalic and atraumatic.   Right Ear: External ear normal.   Left Ear: " External ear normal.   Nose: Nose normal.   Mouth/Throat: Oropharynx is clear and moist.   Neck: Normal range of motion. Neck supple.   Cardiovascular: Normal rate, regular rhythm and normal heart sounds.    Pulmonary/Chest: Effort normal and breath sounds normal.   Abdominal: Soft.   Musculoskeletal: He exhibits no tenderness.   No CVA tenderness present.   Neurological: He is alert and oriented to person, place, and time.   Skin: Skin is warm and dry.   Psychiatric: He has a normal mood and affect. His behavior is normal. Judgment and thought content normal.   Vitals reviewed.              Assessment/Plan:     1. Penile discharge  - suspect chlamydia vs gonorrhea  - azithromycin (ZITHROMAX) 500 MG tablet; Take 2 Tabs by mouth Once for 1 dose.  Dispense: 2 Tab; Refill: 0  - cefTRIAXone (ROCEPHIN) injection 250 mg; 250 mg by Intramuscular route Once.  - Chlamydia/GC PCR Urine Or Swab; Future  - HIV AG/AB COMBO ASSAY SCREENING; Future  - HSV 1/2 By PCR(Herpes)+[id number]; Future  - T.PALLIDUM AB EIA; Future    Differential diagnosis, natural history, supportive care discussed. Follow-up with primary care provider within 7-10 days, emergency room precautions discussed.  Patient and/or family appears understanding of information.  Handout and review of patients diagnosis and treatment was discussed extensively.

## 2019-07-12 ENCOUNTER — HOSPITAL ENCOUNTER (OUTPATIENT)
Facility: MEDICAL CENTER | Age: 34
End: 2019-07-12
Attending: PHYSICIAN ASSISTANT
Payer: COMMERCIAL

## 2019-07-12 ENCOUNTER — TELEPHONE (OUTPATIENT)
Dept: URGENT CARE | Facility: CLINIC | Age: 34
End: 2019-07-12

## 2019-07-12 NOTE — TELEPHONE ENCOUNTER
Reva from lab had called and left a VM stating the patient needed to come in for a recollect. I called lab back and spoke to Yi and the reason why is due to wrong tube was sent. Would you like me to inform the patient and let him to come in for a recollect?

## 2019-07-13 LAB
FORWARD REASON: SPWHY: NORMAL
FORWARDED TO LAB: SPWHR: NORMAL
SPECIMEN SENT: SPWT1: NORMAL

## 2019-07-25 ENCOUNTER — TELEPHONE (OUTPATIENT)
Dept: URGENT CARE | Facility: CLINIC | Age: 34
End: 2019-07-25

## 2019-07-25 NOTE — TELEPHONE ENCOUNTER
Pt called requesting his test results since he still haven't received them. I told the patient that the specimens were sent over to [facility] and that I was going to have [facility] send them to us. I called [facility] and they're faxing the results over and I will scan them in once they get here. Pt expecting a call back regarding his results to go over with them.

## 2019-07-26 ENCOUNTER — TELEPHONE (OUTPATIENT)
Dept: URGENT CARE | Facility: PHYSICIAN GROUP | Age: 34
End: 2019-07-26

## 2019-07-28 ENCOUNTER — OFFICE VISIT (OUTPATIENT)
Dept: URGENT CARE | Facility: CLINIC | Age: 34
End: 2019-07-28
Payer: COMMERCIAL

## 2019-07-28 ENCOUNTER — HOSPITAL ENCOUNTER (OUTPATIENT)
Facility: MEDICAL CENTER | Age: 34
End: 2019-07-28
Attending: NURSE PRACTITIONER
Payer: COMMERCIAL

## 2019-07-28 VITALS
BODY MASS INDEX: 26.98 KG/M2 | OXYGEN SATURATION: 98 % | RESPIRATION RATE: 16 BRPM | TEMPERATURE: 98.7 F | HEIGHT: 68 IN | DIASTOLIC BLOOD PRESSURE: 82 MMHG | SYSTOLIC BLOOD PRESSURE: 126 MMHG | WEIGHT: 178 LBS | HEART RATE: 66 BPM

## 2019-07-28 DIAGNOSIS — R36.9 PENILE DISCHARGE: ICD-10-CM

## 2019-07-28 DIAGNOSIS — R30.0 DYSURIA: ICD-10-CM

## 2019-07-28 LAB
APPEARANCE UR: CLEAR
BILIRUB UR STRIP-MCNC: NEGATIVE MG/DL
COLOR UR AUTO: YELLOW
GLUCOSE UR STRIP.AUTO-MCNC: NEGATIVE MG/DL
KETONES UR STRIP.AUTO-MCNC: NEGATIVE MG/DL
LEUKOCYTE ESTERASE UR QL STRIP.AUTO: NORMAL
NITRITE UR QL STRIP.AUTO: NEGATIVE
PH UR STRIP.AUTO: 7 [PH] (ref 5–8)
PROT UR QL STRIP: NEGATIVE MG/DL
RBC UR QL AUTO: NEGATIVE
SP GR UR STRIP.AUTO: 1.01
UROBILINOGEN UR STRIP-MCNC: 0.2 MG/DL

## 2019-07-28 PROCEDURE — 81002 URINALYSIS NONAUTO W/O SCOPE: CPT | Performed by: NURSE PRACTITIONER

## 2019-07-28 PROCEDURE — 99214 OFFICE O/P EST MOD 30 MIN: CPT | Performed by: NURSE PRACTITIONER

## 2019-07-28 RX ORDER — DOXYCYCLINE HYCLATE 100 MG
100 TABLET ORAL 2 TIMES DAILY
Qty: 14 TAB | Refills: 0 | Status: SHIPPED | OUTPATIENT
Start: 2019-07-28 | End: 2019-08-04

## 2019-07-28 ASSESSMENT — ENCOUNTER SYMPTOMS
FLANK PAIN: 0
SHORTNESS OF BREATH: 0
CONSTIPATION: 0
CHILLS: 0
MYALGIAS: 0
CARDIOVASCULAR NEGATIVE: 1
EYES NEGATIVE: 1
ABDOMINAL PAIN: 0
FEVER: 0
WHEEZING: 0
VOMITING: 0
DIARRHEA: 0
BACK PAIN: 0
RESPIRATORY NEGATIVE: 1
NAUSEA: 0

## 2019-07-28 NOTE — PROGRESS NOTES
"Subjective:   Gadiel Cabrera is a 34 y.o. male who presents for Exposure to STD (pt was treated to chlamydia and ghnorrea but feels like symptoms havent fully gonna away )        Sexually Transmitted Diseases   This is a recurrent problem. The current episode started 1 to 4 weeks ago (States was treated 2 weeks ago for Chlamydia and Gonorrhea, but states symptoms have not fully resolved. States with penile discharge). The problem occurs intermittently. The problem has been waxing and waning. Pertinent negatives include no abdominal pain, chest pain, chills, fever, myalgias, nausea or vomiting. Treatments tried: Took Rocephin and Azithromycin. The treatment provided mild relief.   States he abstained from sexual intercourse for 2 weeks, but now with new sexual partner. States symptoms of dysuria developed shortly after.    Review of Systems   Constitutional: Negative for chills and fever.   Eyes: Negative.    Respiratory: Negative.  Negative for shortness of breath and wheezing.    Cardiovascular: Negative.  Negative for chest pain.   Gastrointestinal: Negative for abdominal pain, constipation, diarrhea, nausea and vomiting.   Genitourinary: Negative for dysuria, flank pain, frequency, hematuria and urgency.        Penile discharge   Musculoskeletal: Negative for back pain and myalgias.   Skin: Negative.      Patient's PMH, SocHx, SurgHx, FamHx, Drug allergies and medications reviewed.     Objective:   /82 (BP Location: Right arm, Patient Position: Sitting, BP Cuff Size: Adult)   Pulse 66   Temp 37.1 °C (98.7 °F) (Temporal)   Resp 16   Ht 1.727 m (5' 8\")   Wt 80.7 kg (178 lb)   SpO2 98%   BMI 27.06 kg/m²   Physical Exam   Constitutional: He is oriented to person, place, and time. He appears well-developed and well-nourished. No distress.   HENT:   Head: Normocephalic.   Right Ear: Hearing normal.   Left Ear: Hearing normal.   Mouth/Throat: Oropharynx is clear and moist and mucous membranes are normal. "   Eyes: Pupils are equal, round, and reactive to light. Conjunctivae are normal.   Cardiovascular: Normal rate, regular rhythm and normal heart sounds.    No murmur heard.  Pulmonary/Chest: Effort normal and breath sounds normal. No respiratory distress.   Abdominal: Soft. Bowel sounds are normal. He exhibits no distension. There is no hepatosplenomegaly. There is no tenderness. There is CVA tenderness.   Genitourinary:   Genitourinary Comments: Declines  Exam   Neurological: He is alert and oriented to person, place, and time.   Skin: Skin is warm and dry. Capillary refill takes less than 2 seconds. No rash noted. He is not diaphoretic.   Psychiatric: He has a normal mood and affect. His speech is normal and behavior is normal. Judgment and thought content normal.   Vitals reviewed.        Assessment/Plan:   Assessment    1. Penile discharge  - CHLAMYDIA/GC PCR URINE OR SWAB; Future  - doxycycline (VIBRAMYCIN) 100 MG Tab; Take 1 Tab by mouth 2 times a day for 7 days.  Dispense: 14 Tab; Refill: 0  - HSV 1/2 IGG W/ TYPE SPECIFIC RFLX; Future  - HIV AG/AB COMBO ASSAY SCREENING; Future    2. Dysuria  - URINE CULTURE(NEW); Future  - POCT Urinalysis    UA with trace leuks. Will send for culture.    I reviewed prior STD testing labs, which came back positive for Chlamydia. Since without resolution of symptoms and with new sexual partner, unsure if resistance or new sexual partner contributing to symptoms, will test and change treatment at this time to Doxycycline to cover if resistant.  Advised to abstain from sexual intercourse for 2 weeks.  Patient insistent on having HIV and HSV labs redrawn, even though prior testing from 2 weeks ago was negative. We discussed that sometimes can take several weeks for seroconversion. However, patient states he would still like bloodwork completed.    Differential diagnosis, natural history, supportive care, and indications for immediate follow-up discussed.     **Please note that  all invasive procedures during this visit were performed by myself and/or the Medical Assistant under the supervision of the PA or MD in office**

## 2019-07-29 LAB
FORWARD REASON: SPWHY: NORMAL
FORWARDED TO LAB: SPWHR: NORMAL
SPECIMEN SENT: SPWT1: NORMAL

## 2019-07-31 ENCOUNTER — TELEPHONE (OUTPATIENT)
Dept: URGENT CARE | Facility: CLINIC | Age: 34
End: 2019-07-31

## 2019-07-31 NOTE — TELEPHONE ENCOUNTER
Called and spoke to patient regarding negative HIV and HSV results. Still pending G/C and urine culture results. I will call once those are available.

## 2019-08-02 ENCOUNTER — TELEPHONE (OUTPATIENT)
Dept: URGENT CARE | Facility: CLINIC | Age: 34
End: 2019-08-02

## 2019-08-04 ENCOUNTER — HOSPITAL ENCOUNTER (OUTPATIENT)
Facility: MEDICAL CENTER | Age: 34
End: 2019-08-04
Attending: NURSE PRACTITIONER
Payer: COMMERCIAL

## 2019-08-04 LAB
FORWARD REASON: SPWHY: NORMAL
FORWARDED TO LAB: SPWHR: NORMAL
SPECIMEN SENT: SPWT1: NORMAL

## 2019-08-07 ENCOUNTER — OFFICE VISIT (OUTPATIENT)
Dept: URGENT CARE | Facility: CLINIC | Age: 34
End: 2019-08-07
Payer: COMMERCIAL

## 2019-08-07 ENCOUNTER — HOSPITAL ENCOUNTER (OUTPATIENT)
Facility: MEDICAL CENTER | Age: 34
End: 2019-08-07
Attending: NURSE PRACTITIONER
Payer: COMMERCIAL

## 2019-08-07 VITALS
RESPIRATION RATE: 18 BRPM | DIASTOLIC BLOOD PRESSURE: 82 MMHG | TEMPERATURE: 97.9 F | WEIGHT: 176 LBS | BODY MASS INDEX: 26.67 KG/M2 | SYSTOLIC BLOOD PRESSURE: 110 MMHG | OXYGEN SATURATION: 97 % | HEART RATE: 64 BPM | HEIGHT: 68 IN

## 2019-08-07 DIAGNOSIS — Z20.2 EXPOSURE TO STD: ICD-10-CM

## 2019-08-07 DIAGNOSIS — R30.0 DYSURIA: ICD-10-CM

## 2019-08-07 LAB
APPEARANCE UR: CLEAR
BILIRUB UR STRIP-MCNC: NORMAL MG/DL
COLOR UR AUTO: YELLOW
FORWARD REASON: SPWHY: NORMAL
FORWARDED TO LAB: SPWHR: NORMAL
GLUCOSE UR STRIP.AUTO-MCNC: NORMAL MG/DL
KETONES UR STRIP.AUTO-MCNC: NORMAL MG/DL
LEUKOCYTE ESTERASE UR QL STRIP.AUTO: NORMAL
NITRITE UR QL STRIP.AUTO: NORMAL
PH UR STRIP.AUTO: 7 [PH] (ref 5–8)
PROT UR QL STRIP: NORMAL MG/DL
RBC UR QL AUTO: NORMAL
SP GR UR STRIP.AUTO: 1.02
SPECIMEN SENT: SPWT1: NORMAL
UROBILINOGEN UR STRIP-MCNC: 0.2 MG/DL

## 2019-08-07 PROCEDURE — 99213 OFFICE O/P EST LOW 20 MIN: CPT | Performed by: NURSE PRACTITIONER

## 2019-08-07 PROCEDURE — 81002 URINALYSIS NONAUTO W/O SCOPE: CPT | Performed by: NURSE PRACTITIONER

## 2019-08-07 RX ORDER — AZITHROMYCIN 500 MG/1
1000 TABLET, FILM COATED ORAL ONCE
Qty: 2 TAB | Refills: 0 | Status: SHIPPED | OUTPATIENT
Start: 2019-08-07 | End: 2019-08-07

## 2019-08-07 ASSESSMENT — ENCOUNTER SYMPTOMS
ABDOMINAL PAIN: 0
VOMITING: 0
FEVER: 0
CHILLS: 0
FLANK PAIN: 0
NAUSEA: 0

## 2019-08-07 NOTE — PROGRESS NOTES
Subjective:     Gadiel Cabrera is a 34 y.o. male who presents for Sexually Transmitted Diseases (treated for G/c for 1 week,symptons are still present)      Approximately 1 months ago, tested positive for chlamydia and treated. Irritation, sense of urination had returned post antibiotic treatment x 2.. Itchy. No penile drainage or redness at urethra. No abdominal pain or fevers. No external lesions. Is here to follow up and get treatment if urine from 8/4 is positive. Had a sample collected 2 days ago, not processed per [facility]. Finished doxy 8/4.       No past medical history on file.    No past surgical history on file.    Social History     Socioeconomic History   • Marital status: Single     Spouse name: Not on file   • Number of children: Not on file   • Years of education: Not on file   • Highest education level: Not on file   Occupational History   • Not on file   Social Needs   • Financial resource strain: Not on file   • Food insecurity:     Worry: Not on file     Inability: Not on file   • Transportation needs:     Medical: Not on file     Non-medical: Not on file   Tobacco Use   • Smoking status: Never Smoker   • Smokeless tobacco: Never Used   Substance and Sexual Activity   • Alcohol use: No   • Drug use: No   • Sexual activity: Not on file   Lifestyle   • Physical activity:     Days per week: Not on file     Minutes per session: Not on file   • Stress: Not on file   Relationships   • Social connections:     Talks on phone: Not on file     Gets together: Not on file     Attends [employer] service: Not on file     Active member of club or organization: Not on file     Attends meetings of clubs or organizations: Not on file     Relationship status: Not on file   • Intimate partner violence:     Fear of current or ex partner: Not on file     Emotionally abused: Not on file     Physically abused: Not on file     Forced sexual activity: Not on file   Other Topics Concern   • Not on file   Social History  "Narrative   • Not on file        No family history on file.     No Known Allergies    Review of Systems   Constitutional: Negative for chills and fever.   Gastrointestinal: Negative for abdominal pain, nausea and vomiting.        No rectal pain.    Genitourinary: Positive for dysuria. Negative for flank pain and hematuria.   All other systems reviewed and are negative.       Objective:   /82 (BP Location: Left arm)   Pulse 64   Temp 36.6 °C (97.9 °F) (Temporal)   Resp 18   Ht 1.727 m (5' 8\")   Wt 79.8 kg (176 lb)   SpO2 97%   BMI 26.76 kg/m²     Physical Exam   Constitutional: He is oriented to person, place, and time. He appears well-developed.   HENT:   Head: Normocephalic.   Right Ear: External ear normal.   Left Ear: External ear normal.   Nose: Nose normal.   Eyes: Conjunctivae are normal.   Neck: Normal range of motion.   Cardiovascular: Normal rate.   Pulmonary/Chest: Effort normal and breath sounds normal.   Abdominal: Soft. Normal appearance and bowel sounds are normal. He exhibits no distension and no mass. There is no tenderness. There is no guarding and no CVA tenderness.   Musculoskeletal: Normal range of motion.   Neurological: He is alert and oriented to person, place, and time.   Skin: Skin is warm and dry.   Psychiatric: He has a normal mood and affect. His behavior is normal. Judgment and thought content normal.   Vitals reviewed.      Assessment/Plan:   1. Dysuria  - POCT Urinalysis  - CHLAMYDIA/GC PCR URINE OR SWAB; Future  - azithromycin (ZITHROMAX) 500 MG tablet; Take 2 Tabs by mouth Once for 1 dose.  Dispense: 2 Tab; Refill: 0  - REFERRAL TO UROLOGY    2. Exposure to STD  - POCT Urinalysis  - CHLAMYDIA/GC PCR URINE OR SWAB; Future  - azithromycin (ZITHROMAX) 500 MG tablet; Take 2 Tabs by mouth Once for 1 dose.  Dispense: 2 Tab; Refill: 0  U/A Negative.    Discussed reoccurrence, and symptoms associated with  Urethritis. Patient educated on antibiotic choice based upon guidelines " and possible re-exposure to an untreated partner.     Differential diagnosis, natural history, supportive care, and indications for immediate follow-up discussed.

## 2019-08-07 NOTE — PATIENT INSTRUCTIONS
Dysuria  Introduction  Dysuria is pain or discomfort while urinating. The pain or discomfort may be felt in the tube that carries urine out of the bladder (urethra) or in the surrounding tissue of the genitals. The pain may also be felt in the groin area, lower abdomen, and lower back. You may have to urinate frequently or have the sudden feeling that you have to urinate (urgency). Dysuria can affect both men and women, but is more common in women.  Dysuria can be caused by many different things, including:  · Urinary tract infection in women.  · Infection of the kidney or bladder.  · Kidney stones or bladder stones.  · Certain sexually transmitted infections (STIs), such as chlamydia.  · Dehydration.  · Inflammation of the vagina.  · Use of certain medicines.  · Use of certain soaps or scented products that cause irritation.  Follow these instructions at home:  Watch your dysuria for any changes. The following actions may help to reduce any discomfort you are feeling:  · Drink enough fluid to keep your urine clear or pale yellow.  · Empty your bladder often. Avoid holding urine for long periods of time.  · After a bowel movement or urination, women should cleanse from front to back, using each tissue only once.  · Empty your bladder after sexual intercourse.  · Take medicines only as directed by your health care provider.  · If you were prescribed an antibiotic medicine, finish it all even if you start to feel better.  · Avoid caffeine, tea, and alcohol. They can irritate the bladder and make dysuria worse. In men, alcohol may irritate the prostate.  · Keep all follow-up visits as directed by your health care provider. This is important.  · If you had any tests done to find the cause of dysuria, it is your responsibility to obtain your test results. Ask the lab or department performing the test when and how you will get your results. Talk with your health care provider if you have any questions about your  results.  Contact a health care provider if:  · You develop pain in your back or sides.  · You have a fever.  · You have nausea or vomiting.  · You have blood in your urine.  · You are not urinating as often as you usually do.  Get help right away if:  · You pain is severe and not relieved with medicines.  · You are unable to hold down any fluids.  · You or someone else notices a change in your mental function.  · You have a rapid heartbeat at rest.  · You have shaking or chills.  · You feel extremely weak.  This information is not intended to replace advice given to you by your health care provider. Make sure you discuss any questions you have with your health care provider.  Document Released: 09/15/2005 Document Revised: 05/25/2017 Document Reviewed: 08/13/2015  © 2017 [employer]  Sexually Transmitted Disease  A sexually transmitted disease (STD) is a disease or infection that may be passed (transmitted) from person to person, usually during sexual activity. This may happen by way of saliva, semen, blood, vaginal mucus, or urine. Common STDs include:  · Gonorrhea.  · Chlamydia.  · Syphilis.  · HIV and AIDS.  · Genital herpes.  · Hepatitis B and C.  · Trichomonas.  · Human papillomavirus (HPV).  · Pubic lice.  · Scabies.  · Mites.  · Bacterial vaginosis.  What are the causes?  An STD may be caused by bacteria, a virus, or parasites. STDs are often transmitted during sexual activity if one person is infected. However, they may also be transmitted through nonsexual means. STDs may be transmitted after:  · Sexual intercourse with an infected person.  · Sharing sex toys with an infected person.  · Sharing needles with an infected person or using unclean piercing or tattoo needles.  · Having intimate contact with the genitals, mouth, or rectal areas of an infected person.  · Exposure to infected fluids during birth.  What are the signs or symptoms?  Different STDs have different symptoms. Some people may not have any  symptoms. If symptoms are present, they may include:  · Painful or bloody urination.  · Pain in the pelvis, abdomen, vagina, anus, throat, or eyes.  · A skin rash, itching, or irritation.  · Growths, ulcerations, blisters, or sores in the genital and anal areas.  · Abnormal vaginal discharge with or without bad odor.  · Penile discharge in men.  · Fever.  · Pain or bleeding during sexual intercourse.  · Swollen glands in the groin area.  · Yellow skin and eyes (jaundice). This is seen with hepatitis.  · Swollen testicles.  · Infertility.  · Sores and blisters in the mouth.  How is this diagnosed?  To make a diagnosis, your health care provider may:  · Take a medical history.  · Perform a physical exam.  · Take a sample of any discharge to examine.  · Swab the throat, cervix, opening to the penis, rectum, or vagina for testing.  · Test a sample of your first morning urine.  · Perform blood tests.  · Perform a Pap test, if this applies.  · Perform a colposcopy.  · Perform a laparoscopy.  How is this treated?  Treatment depends on the STD. Some STDs may be treated but not cured.  · Chlamydia, gonorrhea, trichomonas, and syphilis can be cured with antibiotic medicine.  · Genital herpes, hepatitis, and HIV can be treated, but not cured, with prescribed medicines. The medicines lessen symptoms.  · Genital warts from HPV can be treated with medicine or by freezing, burning (electrocautery), or surgery. Warts may come back.  · HPV cannot be cured with medicine or surgery. However, abnormal areas may be removed from the cervix, vagina, or vulva.  · If your diagnosis is confirmed, your recent sexual partners need treatment. This is true even if they are symptom-free or have a negative culture or evaluation. They should not have sex until their health care providers say it is okay.  · Your health care provider may test you for infection again 3 months after treatment.  How is this prevented?  Take these steps to reduce your  risk of getting an STD:  · Use latex condoms, dental dams, and water-soluble lubricants during sexual activity. Do not use petroleum jelly or oils.  · Avoid having multiple sex partners.  · Do not have sex with someone who has other sex partners.  · Do not have sex with anyone you do not know or who is at high risk for an STD.  · Avoid risky sex practices that can break your skin.  · Do not have sex if you have open sores on your mouth or skin.  · Avoid drinking too much alcohol or taking illegal drugs. Alcohol and drugs can affect your judgment and put you in a vulnerable position.  · Avoid engaging in oral and anal sex acts.  · Get vaccinated for HPV and hepatitis. If you have not received these vaccines in the past, talk to your health care provider about whether one or both might be right for you.  · If you are at risk of being infected with HIV, it is recommended that you take a prescription medicine daily to prevent HIV infection. This is called pre-exposure prophylaxis (PrEP). You are considered at risk if:  ¨ You are a man who has sex with other men (MSM).  ¨ You are a heterosexual man or woman and are sexually active with more than one partner.  ¨ You take drugs by injection.  ¨ You are sexually active with a partner who has HIV.  · Talk with your health care provider about whether you are at high risk of being infected with HIV. If you choose to begin PrEP, you should first be tested for HIV. You should then be tested every 3 months for as long as you are taking PrEP.  Contact a health care provider if:  · See your health care provider.  · Tell your sexual partner(s). They should be tested and treated for any STDs.  · Do not have sex until your health care provider says it is okay.  Get help right away if:  Contact your health care provider right away if:  · You have severe abdominal pain.  · You are a man and notice swelling or pain in your testicles.  · You are a woman and notice swelling or pain in your  vagina.  This information is not intended to replace advice given to you by your health care provider. Make sure you discuss any questions you have with your health care provider.  Document Released: 03/09/2004 Document Revised: 07/07/2017 Document Reviewed: 07/08/2014  [employer] Interactive Patient Education © 2017 [employer]

## 2019-08-12 ENCOUNTER — TELEPHONE (OUTPATIENT)
Dept: URGENT CARE | Facility: CLINIC | Age: 34
End: 2019-08-12

## 2019-08-15 ENCOUNTER — TELEPHONE (OUTPATIENT)
Dept: URGENT CARE | Facility: CLINIC | Age: 34
End: 2019-08-15